# Patient Record
Sex: MALE | Race: WHITE | HISPANIC OR LATINO | Employment: FULL TIME | URBAN - NONMETROPOLITAN AREA
[De-identification: names, ages, dates, MRNs, and addresses within clinical notes are randomized per-mention and may not be internally consistent; named-entity substitution may affect disease eponyms.]

---

## 2022-03-05 ENCOUNTER — OFFICE VISIT (OUTPATIENT)
Dept: URGENT CARE | Facility: CLINIC | Age: 27
End: 2022-03-05

## 2022-03-05 VITALS
HEIGHT: 69 IN | TEMPERATURE: 97.8 F | WEIGHT: 162 LBS | SYSTOLIC BLOOD PRESSURE: 130 MMHG | RESPIRATION RATE: 14 BRPM | OXYGEN SATURATION: 96 % | BODY MASS INDEX: 23.99 KG/M2 | HEART RATE: 80 BPM | DIASTOLIC BLOOD PRESSURE: 100 MMHG

## 2022-03-05 DIAGNOSIS — N48.89 PENILE PAIN: ICD-10-CM

## 2022-03-05 PROCEDURE — 99203 OFFICE O/P NEW LOW 30 MIN: CPT | Performed by: FAMILY MEDICINE

## 2022-03-05 NOTE — PROGRESS NOTES
"CC:  Penile pain        HPI:          He is worried about potential penile injury.   States that he masturbated vigorously for 2 hr after smoking an unknown quantity of methamphetamine.  He noted some swelling, but that has now resolved.    He now c/o inability to get erection.   Denies dysuria or hematuria.       He also drinks daily - 1 pint of vodka or rum daily for the last year.    He decided to quit, so he had cut down - he only had 3 beers last night, and now c/o insomina, anxiety.   + mild tremors.   Denies n/v.      No past medical history on file.       Social History     Tobacco Use   • Smoking status: Former Smoker     Years: 7.00     Types: Cigarettes   • Smokeless tobacco: Never Used   • Tobacco comment: 1/2 pack a day   Vaping Use   • Vaping Use: Every day   • Substances: Nicotine, THC, Flavoring   • Devices: Disposable, Pre-filled or refillable cartridge, Refillable tank, Pre-filled pod   Substance Use Topics   • Alcohol use: Yes     Alcohol/week: 42.6 oz     Types: 43 Cans of beer, 28 Shots of liquor per week     Comment: daily a pint and a 6 pack a day                 Review of Systems   Constitutional: Negative for fever, chills and malaise/fatigue.   Eyes: Negative for vision changes, d/c.    Respiratory: Negative for cough and sputum production.    Cardiovascular: Negative for chest pain and palpitations.   Gastrointestinal: Negative for nausea, vomiting, abdominal pain, diarrhea and constipation.   Genitourinary: Negative for dysuria, urgency and frequency.   Skin: Negative for rash or  itching.   Neurological: Negative for dizziness and tingling.   Psychiatric/Behavioral: Negative for depression.   Hematologic/lymphatic - denies bruising or excessive bleeding  All other systems reviewed and are negative.      OBJECTIVE  /100 (BP Location: Right arm, Patient Position: Sitting, BP Cuff Size: Adult)   Pulse 80   Temp 36.6 °C (97.8 °F) (Temporal)   Resp 14   Ht 1.753 m (5' 9\")   Wt 73.5 " kg (162 lb)   SpO2 96%   HEENT - PERRLA, EOMI  Neuro - alert and oriented x3. CN 2-12 grossly intact.  Lungs - CTA. No wheezes, rhonchi or rales.  Heart - regular rate and rhythm without murmur.    - grossly normal appearing male phallus.   No bruising or obvious injury or trauma.   No rash, erythema.    No discharge.   Scrotum/testicles normal, no tenderness, masses or hernia noted.     Musculoskeletal - No lower extremity edema noted.         A/P:    1. Penile pain     No obvious injury or trauma.   No s/sx of any STIs       - Referral to Urology